# Patient Record
Sex: FEMALE | Race: WHITE | URBAN - METROPOLITAN AREA
[De-identification: names, ages, dates, MRNs, and addresses within clinical notes are randomized per-mention and may not be internally consistent; named-entity substitution may affect disease eponyms.]

---

## 2024-04-08 ENCOUNTER — OFFICE VISIT (OUTPATIENT)
Dept: GASTROENTEROLOGY | Facility: CLINIC | Age: 74
End: 2024-04-08
Payer: MEDICARE

## 2024-04-08 VITALS
HEART RATE: 73 BPM | TEMPERATURE: 97.8 F | SYSTOLIC BLOOD PRESSURE: 138 MMHG | WEIGHT: 148 LBS | BODY MASS INDEX: 27.23 KG/M2 | OXYGEN SATURATION: 99 % | HEIGHT: 62 IN | DIASTOLIC BLOOD PRESSURE: 80 MMHG

## 2024-04-08 DIAGNOSIS — Z12.11 SCREENING FOR COLON CANCER: Primary | ICD-10-CM

## 2024-04-08 DIAGNOSIS — Z86.010 HISTORY OF COLON POLYPS: ICD-10-CM

## 2024-04-08 PROCEDURE — 99203 OFFICE O/P NEW LOW 30 MIN: CPT | Performed by: PHYSICIAN ASSISTANT

## 2024-04-08 RX ORDER — LOSARTAN POTASSIUM 50 MG/1
50 TABLET ORAL DAILY
COMMUNITY
Start: 2024-02-15

## 2024-04-08 RX ORDER — PRAVASTATIN SODIUM 40 MG
40 TABLET ORAL DAILY
COMMUNITY
Start: 2024-03-31

## 2024-04-08 NOTE — PROGRESS NOTES
Answers submitted by the patient for this visit:  Abdominal Pain Questionnaire (Submitted on 4/7/2024)  Chief Complaint: Abdominal pain  Chronicity: new  Progression since onset: resolved  Pain - numeric: 0/10  anorexia: No  arthralgias: No  belching: No  constipation: No  diarrhea: No  dysuria: No  fever: No  flatus: No  frequency: No  headaches: No  hematochezia: No  hematuria: No  melena: No  myalgias: No  nausea: No  weight loss: No  vomiting: No  [facility] - Outpatient Consultation  Fariba Villa 73 y.o. female MRN: [id number]  Encounter: [id number]          ASSESSMENT AND PLAN:      1. Screening for colon cancer  2. History of colon polyps  -Will plan colonoscopy at this time.    -I obtained informed consent from the patient. The risks/benefits/alternatives of the procedure were discussed with the patient. Risks included, but not limited to, infection, bleeding, perforation, injury to organs in the abdomen, missed lesion and incomplete procedure were discussed. Patient was agreeable and electronic signature was obtained.   ______________________________________________________________________    HPI:    73-year-old female with a past medical history significant for colon polyps and allergies who presents to the GI clinic today for consultation.  Patient reports that she is here to schedule colonoscopy.  Patient reports that her last colonoscopy was 10 years ago.  Patient reports that colonoscopy was a normal examination except for some benign polyps removed.  Patient denies any family history of colon cancer or colon polyps.  Patient reports that for the most part her bowels are regular.  She reports that occasionally when she eats bread she will get a little bit more constipated.  She denies any melena or rectal bleeding.  She denies any abdominal pain, nausea, vomiting.    REVIEW OF SYSTEMS:    CONSTITUTIONAL: Denies any fever, chills, rigors, and weight loss.  HEENT: No  earache or tinnitus. Denies hearing loss or visual disturbances.  CARDIOVASCULAR: No chest pain or palpitations.   RESPIRATORY: Denies any cough, hemoptysis, shortness of breath or dyspnea on exertion.  GASTROINTESTINAL: As noted in the History of Present Illness.   GENITOURINARY: No problems with urination. Denies any hematuria or dysuria.  NEUROLOGIC: No dizziness or vertigo, denies headaches.   MUSCULOSKELETAL: Denies any muscle or joint pain.   SKIN: Denies skin rashes or itching.   ENDOCRINE: Denies excessive thirst. Denies intolerance to heat or cold.  PSYCHOSOCIAL: Denies depression or anxiety. Denies any recent memory loss.       Historical Information   Past Medical History:   Diagnosis Date    Hypertension      Past Surgical History:   Procedure Laterality Date    BLADDER SURGERY  2000    bladder and rectal lift due to incontinence     Social History   Social History     Substance and Sexual Activity   Alcohol Use Yes    Alcohol/week: 1.0 standard drink of alcohol    Types: 1 Glasses of wine per week    Comment: occasional     Social History     Substance and Sexual Activity   Drug Use Never     Social History     Tobacco Use   Smoking Status Never    Passive exposure: Never   Smokeless Tobacco Never     History reviewed. No pertinent family history.    Meds/Allergies       Current Outpatient Medications:     estrogens, conjugated (Premarin) vaginal cream    loratadine (CLARITIN) 5 MG chewable tablet    losartan (COZAAR) 50 mg tablet    methenamine hippurate (HIPREX) 1 g tablet    pravastatin (PRAVACHOL) 40 mg tablet    Allergies   Allergen Reactions    Bromelains Anaphylaxis    Beeswax Other (See Comments)    Ciprofloxacin Other (See Comments)    Ibuprofen-Acetaminophen Hives    Pineapple - Food Allergy Swelling    Caffeine - Food Allergy Palpitations     Heart rate goes high ,gets shaky ,chest pain           Objective     Blood pressure 138/80, pulse 73, temperature 97.8 °F (36.6 °C), temperature source  "Temporal, height 5' 2\" (1.575 m), weight 67.1 kg (148 lb), SpO2 99%. Body mass index is 27.07 kg/m².        PHYSICAL EXAM:      General Appearance:   Alert, cooperative, no distress   HEENT:   Normocephalic, atraumatic, anicteric.     Neck:  Supple, symmetrical, trachea midline   Lungs:   Clear to auscultation bilaterally; no rales, rhonchi or wheezing; respirations unlabored    Heart::   Regular rate and rhythm; no murmur, rub, or gallop.   Abdomen:   Soft, non-tender, non-distended; normal bowel sounds; no masses, no organomegaly    Genitalia:   Deferred    Rectal:   Deferred    Extremities:  No cyanosis, clubbing or edema    Pulses:  2+ and symmetric    Skin:  No jaundice, rashes, or lesions    Lymph nodes:  No palpable cervical lymphadenopathy        Lab Results:   No visits with results within 1 Day(s) from this visit.   Latest known visit with results is:   No results found for any previous visit.         Radiology Results:   No results found.  "

## 2024-04-08 NOTE — PATIENT INSTRUCTIONS
Scheduled date of colonoscopy (as of today):5/16/24  Physician performing colonoscopy:Lisa  Location of colonoscopy:[place]  Bowel prep reviewed with patient:Ivanna/Miralax  Instructions reviewed with patient by:Nixon laboy  Clearances:  none